# Patient Record
Sex: FEMALE | Race: WHITE | Employment: UNEMPLOYED | URBAN - METROPOLITAN AREA
[De-identification: names, ages, dates, MRNs, and addresses within clinical notes are randomized per-mention and may not be internally consistent; named-entity substitution may affect disease eponyms.]

---

## 2017-06-21 ENCOUNTER — HOSPITAL ENCOUNTER (OUTPATIENT)
Dept: VASCULAR LAB | Age: 77
Discharge: HOME | End: 2017-06-21
Attending: NURSE PRACTITIONER | Admitting: NURSE PRACTITIONER

## 2017-06-21 DIAGNOSIS — L03.116 CELLULITIS OF LEFT LOWER EXTREMITY: ICD-10-CM

## 2017-07-06 PROBLEM — G93.41 METABOLIC ENCEPHALOPATHY: Status: ACTIVE | Noted: 2017-07-06

## 2017-07-06 PROBLEM — F03.90 DEMENTIA (HCC): Status: ACTIVE | Noted: 2017-07-06

## 2017-07-06 PROBLEM — L03.116 CELLULITIS OF LEFT LOWER EXTREMITY: Status: ACTIVE | Noted: 2017-07-06

## 2017-09-12 PROBLEM — Z59.1 UNSATISFACTORY LIVING CONDITIONS: Status: ACTIVE | Noted: 2017-09-12

## 2017-09-12 PROBLEM — Z87.891 FORMER SMOKER: Chronic | Status: ACTIVE | Noted: 2017-09-12

## 2017-09-12 PROBLEM — L03.116 CELLULITIS OF LEFT LOWER EXTREMITY: Status: RESOLVED | Noted: 2017-07-06 | Resolved: 2017-09-12

## 2017-09-12 PROBLEM — N39.0 UTI (URINARY TRACT INFECTION): Status: ACTIVE | Noted: 2017-09-12

## 2017-09-12 PROBLEM — D75.89 MACROCYTOSIS WITHOUT ANEMIA: Chronic | Status: ACTIVE | Noted: 2017-09-12

## 2017-09-12 PROBLEM — F03.90 DEMENTIA (HCC): Status: RESOLVED | Noted: 2017-07-06 | Resolved: 2017-09-12

## 2017-09-12 PROBLEM — T42.6X1A VALPROIC ACID TOXICITY: Status: ACTIVE | Noted: 2017-09-12

## 2017-12-20 ENCOUNTER — HOSPITAL ENCOUNTER (OUTPATIENT)
Dept: ENDOSCOPY | Age: 77
Discharge: NURSING, REHAB OR LONG-TERM CARE | End: 2017-12-20
Attending: INTERNAL MEDICINE | Admitting: INTERNAL MEDICINE

## 2017-12-20 NOTE — PROGRESS NOTES
Spoke with CHARLOTTE TREVIÑO at [facility] care facility,Patient to be sent back to Facility and Conrad Gallagher to speak with daughter in regards to further treatment. Transportation notified. CHARITY at beside.

## 2017-12-20 NOTE — PROGRESS NOTES
Pt brought to facility for EGD with PEG tube placement. Pt awake with eyes open but non-verbal.Consent obtained via Preadmission Testing. Anesthesia at beside and not cleared for procedure. Pt's Daughter Coleman Mota notified. Pt will need to cardiology clearance. Arrangements to be made for transportation back to facility.

## 2018-04-12 PROBLEM — N39.0 UTI (URINARY TRACT INFECTION): Status: RESOLVED | Noted: 2017-09-12 | Resolved: 2018-04-12
